# Patient Record
Sex: MALE | Race: WHITE
[De-identification: names, ages, dates, MRNs, and addresses within clinical notes are randomized per-mention and may not be internally consistent; named-entity substitution may affect disease eponyms.]

---

## 2018-02-08 ENCOUNTER — HOSPITAL ENCOUNTER (OUTPATIENT)
Dept: HOSPITAL 45 - C.RADBC | Age: 33
Discharge: HOME | End: 2018-02-08
Attending: NURSE PRACTITIONER
Payer: COMMERCIAL

## 2018-02-08 DIAGNOSIS — M72.2: Primary | ICD-10-CM

## 2018-02-08 NOTE — DIAGNOSTIC IMAGING REPORT
R FOOT MIN 3 VIEWS ROUTINE



CLINICAL HISTORY: RIGHT FOOT PAIN     



COMPARISON: None.



DISCUSSION: No acute fractures or dislocations are visualized. There are no

erosive changes. There is no pathologic periostitis.    



IMPRESSION: No significant bony abnormalities







Electronically signed by:  Balta Burk M.D.

2/8/2018 11:06 AM



Dictated Date/Time:  2/8/2018 11:06 AM